# Patient Record
Sex: FEMALE | Race: WHITE | HISPANIC OR LATINO | URBAN - METROPOLITAN AREA
[De-identification: names, ages, dates, MRNs, and addresses within clinical notes are randomized per-mention and may not be internally consistent; named-entity substitution may affect disease eponyms.]

---

## 2023-08-25 ENCOUNTER — WEB ENCOUNTER (OUTPATIENT)
Dept: URBAN - METROPOLITAN AREA CLINIC 96 | Facility: CLINIC | Age: 53
End: 2023-08-25

## 2023-08-30 ENCOUNTER — LAB OUTSIDE AN ENCOUNTER (OUTPATIENT)
Dept: URBAN - METROPOLITAN AREA CLINIC 96 | Facility: CLINIC | Age: 53
End: 2023-08-30

## 2023-08-30 ENCOUNTER — OFFICE VISIT (OUTPATIENT)
Dept: URBAN - METROPOLITAN AREA CLINIC 96 | Facility: CLINIC | Age: 53
End: 2023-08-30
Payer: COMMERCIAL

## 2023-08-30 VITALS
BODY MASS INDEX: 20.41 KG/M2 | DIASTOLIC BLOOD PRESSURE: 77 MMHG | WEIGHT: 127 LBS | SYSTOLIC BLOOD PRESSURE: 107 MMHG | HEART RATE: 59 BPM | TEMPERATURE: 97.7 F | HEIGHT: 66 IN

## 2023-08-30 DIAGNOSIS — Z85.3 PERSONAL HISTORY OF BREAST CANCER: ICD-10-CM

## 2023-08-30 DIAGNOSIS — Z12.11 COLON CANCER SCREENING: ICD-10-CM

## 2023-08-30 DIAGNOSIS — K58.1 IRRITABLE BOWEL SYNDROME WITH CONSTIPATION: ICD-10-CM

## 2023-08-30 PROBLEM — 429087003: Status: ACTIVE | Noted: 2023-08-30

## 2023-08-30 PROBLEM — 440630006: Status: ACTIVE | Noted: 2023-08-30

## 2023-08-30 PROCEDURE — 99204 OFFICE O/P NEW MOD 45 MIN: CPT | Performed by: INTERNAL MEDICINE

## 2023-08-30 RX ORDER — ONDANSETRON HCL 8 MG
TAKE 1 TABLET (8 MG) BY ORAL ROUTE 1-2 HOURS PRIOR TO RADIATION THERAPY TABLET ORAL
Qty: 1 | Refills: 0 | Status: ON HOLD | COMMUNITY
Start: 1900-01-01

## 2023-08-30 RX ORDER — DIPHENOXYLATE HCL/ATROPINE 2.5-.025MG
TAKE 2 TABLETS (5 MG) BY ORAL ROUTE ONCE DAILY AS NEEDED TABLET ORAL 1
Qty: 0 | Refills: 0 | Status: ON HOLD | COMMUNITY
Start: 1900-01-01

## 2023-08-30 RX ORDER — SODIUM, POTASSIUM,MAG SULFATES 17.5-3.13G
354 ML SOLUTION, RECONSTITUTED, ORAL ORAL AS DIRECTED
Qty: 354 ML | Refills: 0 | OUTPATIENT
Start: 2023-08-30 | End: 2023-08-31

## 2023-08-30 RX ORDER — DEXAMETHASONE 4 MG/1
TAKE 1 TABLET (4 MG) BY ORAL ROUTE 2 TIMES PER DAY TABLET ORAL 2
Qty: 0 | Refills: 0 | Status: ON HOLD | COMMUNITY
Start: 1900-01-01

## 2023-08-30 RX ORDER — TAMOXIFEN CITRATE 20 MG/1
TABLET, FILM COATED ORAL
Qty: 0 | Refills: 0 | Status: ON HOLD | COMMUNITY
Start: 1900-01-01

## 2023-08-30 RX ORDER — ERGOCALCIFEROL 1.25 MG/1
TAKE 1 CAPSULE (50,000 UNIT) BY ORAL ROUTE ONCE WEEKLY CAPSULE ORAL
Qty: 0 | Refills: 0 | Status: ON HOLD | COMMUNITY
Start: 1900-01-01

## 2023-08-30 RX ORDER — ONDANSETRON 4 MG/1
2 TABLETS TABLET, FILM COATED ORAL
Qty: 2 TABLETS | Refills: 0 | OUTPATIENT
Start: 2023-08-30

## 2023-08-30 NOTE — HPI-TODAY'S VISIT:
This is a 53-year-old female referred Dr Anna Doyle for a GI consultation and a copy will be sent to the referring provider.  Patient was seen in 2018 to set up colon cancer screening.  She had a history of IBS constipation and breast cancer.  She had chemo and double mastectomy with reconstruction.  Her sister also had breast cancer as did her grandmother.  Dad had prostate cancer.  She did undergo genetic counseling at [facility] that was negative.  Patient had a colonoscopy on September 28, 2018 this revealed internal hemorrhoids otherwise normal exam.  I recommended repeat exam in 5 years. Pt has no cardiopulm. issues. Pt did the 2 day prep last time and it worked. Pt tried miralax and fiber bloated her. She takes laxaKomfort for years. It has senna and other herbs. Pt goes about twice a day. No GERD and no anemia.

## 2024-02-09 ENCOUNTER — COLON (OUTPATIENT)
Dept: URBAN - METROPOLITAN AREA SURGERY CENTER 18 | Facility: SURGERY CENTER | Age: 54
End: 2024-02-09
Payer: COMMERCIAL

## 2024-02-09 ENCOUNTER — LAB (OUTPATIENT)
Dept: URBAN - METROPOLITAN AREA CLINIC 4 | Facility: CLINIC | Age: 54
End: 2024-02-09
Payer: COMMERCIAL

## 2024-02-09 VITALS — BODY MASS INDEX: 20.41 KG/M2 | WEIGHT: 127 LBS | HEIGHT: 66 IN

## 2024-02-09 DIAGNOSIS — D12.2 BENIGN NEOPLASM OF ASCENDING COLON: ICD-10-CM

## 2024-02-09 DIAGNOSIS — Z12.11 COLON CANCER SCREENING: ICD-10-CM

## 2024-02-09 DIAGNOSIS — D12.2 ADENOMA OF ASCENDING COLON: ICD-10-CM

## 2024-02-09 PROCEDURE — G8907 PT DOC NO EVENTS ON DISCHARG: HCPCS | Performed by: INTERNAL MEDICINE

## 2024-02-09 PROCEDURE — 45380 COLONOSCOPY AND BIOPSY: CPT | Performed by: INTERNAL MEDICINE

## 2024-02-09 PROCEDURE — 88305 TISSUE EXAM BY PATHOLOGIST: CPT | Performed by: PATHOLOGY

## 2024-02-09 RX ORDER — ONDANSETRON HCL 8 MG
TAKE 1 TABLET (8 MG) BY ORAL ROUTE 1-2 HOURS PRIOR TO RADIATION THERAPY TABLET ORAL
Qty: 1 | Refills: 0 | Status: ON HOLD | COMMUNITY
Start: 1900-01-01

## 2024-02-09 RX ORDER — DEXAMETHASONE 4 MG/1
TAKE 1 TABLET (4 MG) BY ORAL ROUTE 2 TIMES PER DAY TABLET ORAL 2
Qty: 0 | Refills: 0 | Status: ON HOLD | COMMUNITY
Start: 1900-01-01

## 2024-02-09 RX ORDER — ERGOCALCIFEROL 1.25 MG/1
TAKE 1 CAPSULE (50,000 UNIT) BY ORAL ROUTE ONCE WEEKLY CAPSULE ORAL
Qty: 0 | Refills: 0 | Status: ON HOLD | COMMUNITY
Start: 1900-01-01

## 2024-02-09 RX ORDER — DIPHENOXYLATE HCL/ATROPINE 2.5-.025MG
TAKE 2 TABLETS (5 MG) BY ORAL ROUTE ONCE DAILY AS NEEDED TABLET ORAL 1
Qty: 0 | Refills: 0 | Status: ON HOLD | COMMUNITY
Start: 1900-01-01

## 2024-02-09 RX ORDER — TAMOXIFEN CITRATE 20 MG/1
TABLET, FILM COATED ORAL
Qty: 0 | Refills: 0 | Status: ON HOLD | COMMUNITY
Start: 1900-01-01

## 2024-02-09 RX ORDER — ONDANSETRON 4 MG/1
2 TABLETS TABLET, FILM COATED ORAL
Qty: 2 TABLETS | Refills: 0 | Status: ON HOLD | COMMUNITY
Start: 2023-08-30

## 2024-02-09 RX ORDER — ONDANSETRON HYDROCHLORIDE 4 MG/1
1 TABLET TABLET, FILM COATED ORAL
Qty: 2 TABLETS | Refills: 0 | Status: ON HOLD | COMMUNITY
Start: 2024-02-02